# Patient Record
Sex: MALE | Race: WHITE | NOT HISPANIC OR LATINO | URBAN - METROPOLITAN AREA
[De-identification: names, ages, dates, MRNs, and addresses within clinical notes are randomized per-mention and may not be internally consistent; named-entity substitution may affect disease eponyms.]

---

## 2021-07-12 ENCOUNTER — EMERGENCY (EMERGENCY)
Facility: HOSPITAL | Age: 26
LOS: 1 days | Discharge: HOME | End: 2021-07-12
Attending: EMERGENCY MEDICINE
Payer: COMMERCIAL

## 2021-07-12 VITALS
RESPIRATION RATE: 18 BRPM | SYSTOLIC BLOOD PRESSURE: 118 MMHG | OXYGEN SATURATION: 97 % | WEIGHT: 154.1 LBS | HEART RATE: 56 BPM | DIASTOLIC BLOOD PRESSURE: 75 MMHG | HEIGHT: 72 IN | TEMPERATURE: 98 F

## 2021-07-12 PROCEDURE — 99284 EMERGENCY DEPT VISIT MOD MDM: CPT

## 2021-07-12 RX ORDER — EPINEPHRINE 0.3 MG/.3ML
0.3 INJECTION INTRAMUSCULAR; SUBCUTANEOUS
Qty: 1 | Refills: 0
Start: 2021-07-12 | End: 2021-07-12

## 2021-07-12 RX ADMIN — Medication 60 MILLIGRAM(S): at 12:07

## 2021-07-12 NOTE — ED PROVIDER NOTE - NS ED ROS FT
Constitutional: no fever, no chills  Head: NC, AT   Eyes: no redness   ENMT: no nasal congestion/drainage, no sore throat   CV: no chest pain, no edema  Resp: no cough, no dyspnea  GI: no abdominal pain, no nausea, no vomiting, no diarrhea  : no dysuria, no hematuria   Skin: no lesions, no rashes, +bites right ear and left arm   Neuro: no LOC, +right sided headache, no sensory deficits, no weakness

## 2021-07-12 NOTE — ED PROVIDER NOTE - PHYSICAL EXAMINATION
General: well appearing, NAD  Head: NC, AT  EENT: EOMI, no scleral icterus  Cardiac: RRR, no apparent murmurs, no lower extremity edema  Respiratory: CTABL, no respiratory distress   MSK/Vascular: full ROM, soft compartments, warm extremities  Neuro: AAOx3, sensation to light touch intact  Skin: +bite regino right ear and left forearm, no erythema/tenderness but warm to touch   Psych: calm, cooperative

## 2021-07-12 NOTE — ED PROVIDER NOTE - PROGRESS NOTE DETAILS
Alan: Discussed plan for discharge home with epi pen with family. Family expressed concern about the cost of an epi pen. I called [facility], they will review the patients insurance/info.

## 2021-07-12 NOTE — ED ADULT NURSE NOTE - OBJECTIVE STATEMENT
pt a&ox4, vss, biba for allergic reaction, pt was stung by a bee and developed hives, gvn benadryl in ambulance, pt in NAD @ this time, pt placed on cardiac monitor/. POC discussed w/ patient. no other meds needed @ this time. respirations even and unlabored. will continue to observe.

## 2021-07-12 NOTE — ED PROVIDER NOTE - CARE PROVIDER_API CALL
Feng Dixon)  [facility]  [address] [place] [address]  Phone: [phone]  Fax: [phone]  Follow Up Time:

## 2021-07-12 NOTE — ED PROVIDER NOTE - OBJECTIVE STATEMENT
26 year old male with history of asthma who presents following allergic reaction. The patient was playing kickball and then while retrieving a ball accidently stepped on a bee hive. He sustained a bite to his right ear and left arm. About 3 minutes later he began to have some periorbital swelling, dizziness, nausea, lightheadedness, and right sided headache. EMS called and patient received 50 mg IV benadryl en route. Here in the ED the patient reports mild right sided headache but otherwise feels close to normal. States that as a child he once had several bee bites and his throat nearly closed but he was not intubated, unable to recall many more details of the event. He has never seen an allergist. He was feeling well prior to this event. 26 year old male with history of asthma who presents following allergic reaction. The patient was playing kickball and then while retrieving a ball accidently stepped on a bee hive. He sustained a sting to his right ear and left arm. About 3 minutes later he began to have some periorbital swelling, dizziness, nausea, lightheadedness, and right sided headache. EMS called and patient received 50 mg IV benadryl en route. Here in the ED the patient reports mild right sided headache but otherwise feels close to normal. States that as a child he once had several bee stings and his throat nearly closed but he was not intubated, unable to recall many more details of the event. He has never seen an allergist. He was feeling well prior to this event.

## 2021-07-12 NOTE — ED ADULT TRIAGE NOTE - CHIEF COMPLAINT QUOTE
Pt BIBA, c/o hives s/p bee sting, known allergy, was given 50mg IV benadryl by EMS, resp even and unlabored, hives subsided since receiving medication

## 2021-07-12 NOTE — ED PROVIDER NOTE - CLINICAL SUMMARY MEDICAL DECISION MAKING FREE TEXT BOX
26 year old male with history of asthma who presents following allergic reaction. Pt given benadryl 50 mg IV PTA. Given prednisone 60 mg PO on arrival. Pt symptom free at time of discharge. He will be discharged home with Rx for epi pen in addition to f/u with allergist.

## 2021-07-12 NOTE — ED PROVIDER NOTE - ATTENDING CONTRIBUTION TO CARE
I, Farhan Lehman, performed a face to face bedside interview with this patient regarding history of present illness, review of symptoms and relevant past medical, social and family history.  I completed an independent physical examination. I have communicated the patient’s plan of care and disposition with the resident.  26 year old male with PMh asthma presents following allergic reaction. Pt was stung by bee, immediately broke into hives. NO SOB, difficulty swallowing. Given injection benadryl prior to arrival and Sx resolved.   Gen: NAD, well appearing  CV: RRR  Pul: CTA b/l  Abd: Soft, non-distended, non-tender  Neuro: no focal deficits  Pt improved, no signs of anaphylaxis, stable for dc

## 2021-08-24 ENCOUNTER — EMERGENCY (EMERGENCY)
Facility: HOSPITAL | Age: 26
LOS: 1 days | Discharge: HOME | End: 2021-08-24
Attending: EMERGENCY MEDICINE
Payer: COMMERCIAL

## 2021-08-24 VITALS
DIASTOLIC BLOOD PRESSURE: 73 MMHG | OXYGEN SATURATION: 97 % | TEMPERATURE: 98 F | HEIGHT: 72 IN | RESPIRATION RATE: 20 BRPM | WEIGHT: 150.8 LBS | HEART RATE: 58 BPM | SYSTOLIC BLOOD PRESSURE: 115 MMHG

## 2021-08-24 PROCEDURE — 99283 EMERGENCY DEPT VISIT LOW MDM: CPT

## 2021-08-24 RX ORDER — EPINEPHRINE 0.3 MG/.3ML
0.3 INJECTION INTRAMUSCULAR; SUBCUTANEOUS
Qty: 2 | Refills: 0
Start: 2021-08-24 | End: 2021-08-24

## 2021-08-24 RX ORDER — DIPHENHYDRAMINE HCL 50 MG
25 CAPSULE ORAL ONCE
Refills: 0 | Status: COMPLETED | OUTPATIENT
Start: 2021-08-24 | End: 2021-08-24

## 2021-08-24 RX ORDER — FAMOTIDINE 10 MG/ML
20 INJECTION INTRAVENOUS ONCE
Refills: 0 | Status: COMPLETED | OUTPATIENT
Start: 2021-08-24 | End: 2021-08-24

## 2021-08-24 RX ORDER — FAMOTIDINE 10 MG/ML
1 INJECTION INTRAVENOUS
Qty: 10 | Refills: 0
Start: 2021-08-24 | End: 2021-08-28

## 2021-08-24 RX ORDER — DIPHENHYDRAMINE HCL 50 MG
1 CAPSULE ORAL
Qty: 15 | Refills: 0
Start: 2021-08-24 | End: 2021-08-28

## 2021-08-24 RX ADMIN — FAMOTIDINE 20 MILLIGRAM(S): 10 INJECTION INTRAVENOUS at 11:15

## 2021-08-24 RX ADMIN — Medication 25 MILLIGRAM(S): at 11:15

## 2021-08-24 RX ADMIN — Medication 60 MILLIGRAM(S): at 11:15

## 2021-08-24 NOTE — ED PROVIDER NOTE - OBJECTIVE STATEMENT
27 y/o male h/o allergic reaction to bees presents c/o upper lip swelling after being stung by a bee in the upper lip. He immediately took his epi pen and came to the ED.

## 2021-08-24 NOTE — ED ADULT NURSE NOTE - OBJECTIVE STATEMENT
assumed pt care at 1115.  pt awake, alert and oriented x3 c/o wasp sting to upper lip at 1000 today, used his epi pen immediately.  hx of allergic reactions to wasp stings.  denies shortness of breath or diff breathing.  speech clear.

## 2021-08-24 NOTE — ED PROVIDER NOTE - NSFOLLOWUPINSTRUCTIONS_ED_ALL_ED_FT
Apply ice to affected area for 15-20 minutes every few hours for the next few days.  Use as much or as frequently as desired. Take meds as prescribed.

## 2021-08-24 NOTE — ED ADULT TRIAGE NOTE - CHIEF COMPLAINT QUOTE
" A wasp stung me in my lip today about 30 minutes ago" pt has upper lip swollen pt was at work when it happened, pt administered an epi pen on himself. pt denies any chest pain, SOB difficulty swallowing.

## 2021-08-24 NOTE — ED PROVIDER NOTE - PATIENT PORTAL LINK FT
You can access the FollowMyHealth Patient Portal offered by [facility] by registering at the following website: [email or web address]. By joining [employer]’s FollowMyHealth portal, you will also be able to view your health information using other applications (apps) compatible with our system.

## 2021-08-24 NOTE — ED PROVIDER NOTE - ATTENDING CONTRIBUTION TO CARE
stung by a bee on upper lip, developed swelling of upper lip.  Injected self with epipen at 1000 because of prior anaphylactic reaction with bee sting.  Denies diff breathing, LH, abdominal pain, n/v.  PE:  nontoxic appearing, NARD, chest CTA, swelling of upper lip without tongue swelling.  A/P  bee sting with localized reaction.
